# Patient Record
Sex: FEMALE | Race: WHITE | NOT HISPANIC OR LATINO | Employment: PART TIME | URBAN - NONMETROPOLITAN AREA
[De-identification: names, ages, dates, MRNs, and addresses within clinical notes are randomized per-mention and may not be internally consistent; named-entity substitution may affect disease eponyms.]

---

## 2023-09-21 ENCOUNTER — OFFICE VISIT (OUTPATIENT)
Dept: URGENT CARE | Facility: CLINIC | Age: 28
End: 2023-09-21

## 2023-09-21 VITALS
DIASTOLIC BLOOD PRESSURE: 88 MMHG | OXYGEN SATURATION: 98 % | TEMPERATURE: 98 F | RESPIRATION RATE: 18 BRPM | HEART RATE: 99 BPM | SYSTOLIC BLOOD PRESSURE: 121 MMHG

## 2023-09-21 DIAGNOSIS — U07.1 COVID-19: Primary | ICD-10-CM

## 2023-09-21 DIAGNOSIS — J02.9 SORE THROAT: ICD-10-CM

## 2023-09-21 PROBLEM — M79.7 FIBROMYALGIA: Status: ACTIVE | Noted: 2023-09-21

## 2023-09-21 PROBLEM — J45.20 MILD INTERMITTENT ASTHMA WITHOUT COMPLICATION: Status: ACTIVE | Noted: 2023-09-21

## 2023-09-21 LAB
SARS-COV-2 AG UPPER RESP QL IA: POSITIVE
VALID CONTROL: ABNORMAL

## 2023-09-21 PROCEDURE — 87811 SARS-COV-2 COVID19 W/OPTIC: CPT | Performed by: PHYSICIAN ASSISTANT

## 2023-09-21 PROCEDURE — G0383 LEV 4 HOSP TYPE B ED VISIT: HCPCS | Performed by: PHYSICIAN ASSISTANT

## 2023-09-21 RX ORDER — NIRMATRELVIR AND RITONAVIR 300-100 MG
3 KIT ORAL 2 TIMES DAILY
Qty: 30 TABLET | Refills: 0 | Status: SHIPPED | OUTPATIENT
Start: 2023-09-21 | End: 2023-09-26

## 2023-09-21 RX ORDER — NORETHINDRONE ACETATE AND ETHINYL ESTRADIOL AND FERROUS FUMARATE 5-7-9-7
KIT ORAL DAILY
COMMUNITY

## 2023-09-21 RX ORDER — ALBUTEROL SULFATE 90 UG/1
2 AEROSOL, METERED RESPIRATORY (INHALATION) EVERY 6 HOURS PRN
COMMUNITY
Start: 2023-05-25

## 2023-09-21 RX ORDER — METFORMIN HYDROCHLORIDE 500 MG/1
TABLET, EXTENDED RELEASE ORAL
COMMUNITY

## 2023-09-21 NOTE — LETTER
September 21, 2023     Patient: Justine Krabbe   YOB: 1995   Date of Visit: 9/21/2023       To Whom it May Concern:    Justine Krabbe was seen in my clinic on 9/21/2023. She may return to work on 09/25/2023 . She tested positive for COVID    If you have any questions or concerns, please don't hesitate to call.          Sincerely,          AYAAN Hobbs        CC: No Recipients

## 2023-09-25 ENCOUNTER — TELEPHONE (OUTPATIENT)
Dept: URGENT CARE | Facility: CLINIC | Age: 28
End: 2023-09-25

## 2023-09-25 NOTE — TELEPHONE ENCOUNTER
Patient was seen in the clinic on 921 and tested positive for COVID. She states that she still has fever and nausea. She is requesting a note to be off of work for few more days for continued symptoms of COVID. I will extend her note for 2 more days. She already meets the guidelines as per CDC for 5-day quarantine. She was instructed to follow-up with her PCP or go to the ER if symptoms worsen.

## 2023-09-25 NOTE — LETTER
September 25, 2023     Patient: Cm Ring  YOB: 1995  Date of Visit: 9/25/2023      To Whom it May Concern:    Cm Ring is under my professional care. Nathan Catherine was seen in my office on 9/21/2023. Nathan Catherine may return to work on 09/27/2023 . If you have any questions or concerns, please don't hesitate to call.          Sincerely,          Keira Park PA-C        CC: No Recipients

## 2023-11-19 ENCOUNTER — HOSPITAL ENCOUNTER (EMERGENCY)
Facility: HOSPITAL | Age: 28
Discharge: HOME | End: 2023-11-19
Attending: EMERGENCY MEDICINE

## 2023-11-19 ENCOUNTER — APPOINTMENT (EMERGENCY)
Dept: CT IMAGING | Facility: HOSPITAL | Age: 28
End: 2023-11-19

## 2023-11-19 VITALS
DIASTOLIC BLOOD PRESSURE: 58 MMHG | SYSTOLIC BLOOD PRESSURE: 105 MMHG | HEIGHT: 66 IN | HEART RATE: 74 BPM | BODY MASS INDEX: 30.15 KG/M2 | OXYGEN SATURATION: 97 % | WEIGHT: 187.61 LBS | TEMPERATURE: 96.7 F | RESPIRATION RATE: 18 BRPM

## 2023-11-19 DIAGNOSIS — K62.5 BRBPR (BRIGHT RED BLOOD PER RECTUM): ICD-10-CM

## 2023-11-19 DIAGNOSIS — R10.9 ABDOMINAL PAIN: ICD-10-CM

## 2023-11-19 DIAGNOSIS — R19.7 NAUSEA VOMITING AND DIARRHEA: Primary | ICD-10-CM

## 2023-11-19 DIAGNOSIS — R11.2 NAUSEA VOMITING AND DIARRHEA: Primary | ICD-10-CM

## 2023-11-19 LAB
ALBUMIN SERPL BCP-MCNC: 4.6 G/DL (ref 3.5–5)
ALP SERPL-CCNC: 34 U/L (ref 34–104)
ALT SERPL W P-5'-P-CCNC: 16 U/L (ref 7–52)
ANION GAP SERPL CALCULATED.3IONS-SCNC: 15 MMOL/L
ANISOCYTOSIS BLD QL SMEAR: PRESENT
AST SERPL W P-5'-P-CCNC: 12 U/L (ref 13–39)
BACTERIA UR QL AUTO: ABNORMAL /HPF
BASOPHILS # BLD MANUAL: 0 THOUSAND/UL (ref 0–0.1)
BASOPHILS NFR MAR MANUAL: 0 % (ref 0–1)
BILIRUB SERPL-MCNC: 0.57 MG/DL (ref 0.2–1)
BILIRUB UR QL STRIP: NEGATIVE
BUN SERPL-MCNC: 14 MG/DL (ref 5–25)
CALCIUM SERPL-MCNC: 9.8 MG/DL (ref 8.4–10.2)
CHLORIDE SERPL-SCNC: 101 MMOL/L (ref 96–108)
CLARITY UR: CLEAR
CO2 SERPL-SCNC: 21 MMOL/L (ref 21–32)
COLOR UR: YELLOW
CREAT SERPL-MCNC: 0.81 MG/DL (ref 0.6–1.3)
EOSINOPHIL # BLD MANUAL: 0 THOUSAND/UL (ref 0–0.4)
EOSINOPHIL NFR BLD MANUAL: 0 % (ref 0–6)
ERYTHROCYTE [DISTWIDTH] IN BLOOD BY AUTOMATED COUNT: 13.2 % (ref 11.6–15.1)
GFR SERPL CREATININE-BSD FRML MDRD: 99 ML/MIN/1.73SQ M
GLUCOSE SERPL-MCNC: 151 MG/DL (ref 65–140)
GLUCOSE UR STRIP-MCNC: NEGATIVE MG/DL
HCG SERPL QL: NEGATIVE
HCT VFR BLD AUTO: 44.1 % (ref 34.8–46.1)
HGB BLD-MCNC: 14.5 G/DL (ref 11.5–15.4)
HGB UR QL STRIP.AUTO: ABNORMAL
KETONES UR STRIP-MCNC: ABNORMAL MG/DL
LACTATE SERPL-SCNC: 3.4 MMOL/L (ref 0.5–2)
LACTATE SERPL-SCNC: 4.6 MMOL/L (ref 0.5–2)
LEUKOCYTE ESTERASE UR QL STRIP: NEGATIVE
LIPASE SERPL-CCNC: 10 U/L (ref 11–82)
LYMPHOCYTES # BLD AUTO: 0 % (ref 14–44)
LYMPHOCYTES # BLD AUTO: 0.42 THOUSAND/UL (ref 0.6–4.47)
MCH RBC QN AUTO: 28.8 PG (ref 26.8–34.3)
MCHC RBC AUTO-ENTMCNC: 32.9 G/DL (ref 31.4–37.4)
MCV RBC AUTO: 88 FL (ref 82–98)
MONOCYTES # BLD AUTO: 0 THOUSAND/UL (ref 0–1.22)
MONOCYTES NFR BLD: 0 % (ref 4–12)
MUCOUS THREADS UR QL AUTO: ABNORMAL
NEUTROPHILS # BLD MANUAL: 20.5 THOUSAND/UL (ref 1.85–7.62)
NEUTS BAND NFR BLD MANUAL: 4 % (ref 0–8)
NEUTS SEG NFR BLD AUTO: 94 % (ref 43–75)
NITRITE UR QL STRIP: NEGATIVE
NON-SQ EPI CELLS URNS QL MICRO: ABNORMAL /HPF
PH UR STRIP.AUTO: 6.5 [PH]
PLATELET # BLD AUTO: 380 THOUSANDS/UL (ref 149–390)
PLATELET BLD QL SMEAR: ADEQUATE
PMV BLD AUTO: 9.4 FL (ref 8.9–12.7)
POTASSIUM SERPL-SCNC: 4.1 MMOL/L (ref 3.5–5.3)
PROT SERPL-MCNC: 7.6 G/DL (ref 6.4–8.4)
PROT UR STRIP-MCNC: NEGATIVE MG/DL
RBC # BLD AUTO: 5.04 MILLION/UL (ref 3.81–5.12)
RBC #/AREA URNS AUTO: ABNORMAL /HPF
RBC MORPH BLD: PRESENT
SODIUM SERPL-SCNC: 137 MMOL/L (ref 135–147)
SP GR UR STRIP.AUTO: 1.02 (ref 1–1.03)
UROBILINOGEN UR QL STRIP.AUTO: 0.2 E.U./DL
VARIANT LYMPHS # BLD AUTO: 2 %
WBC # BLD AUTO: 20.92 THOUSAND/UL (ref 4.31–10.16)
WBC #/AREA URNS AUTO: ABNORMAL /HPF

## 2023-11-19 PROCEDURE — 99284 EMERGENCY DEPT VISIT MOD MDM: CPT

## 2023-11-19 PROCEDURE — 96361 HYDRATE IV INFUSION ADD-ON: CPT

## 2023-11-19 PROCEDURE — 80053 COMPREHEN METABOLIC PANEL: CPT | Performed by: EMERGENCY MEDICINE

## 2023-11-19 PROCEDURE — 83605 ASSAY OF LACTIC ACID: CPT | Performed by: EMERGENCY MEDICINE

## 2023-11-19 PROCEDURE — 74177 CT ABD & PELVIS W/CONTRAST: CPT

## 2023-11-19 PROCEDURE — 96375 TX/PRO/DX INJ NEW DRUG ADDON: CPT

## 2023-11-19 PROCEDURE — 81001 URINALYSIS AUTO W/SCOPE: CPT | Performed by: EMERGENCY MEDICINE

## 2023-11-19 PROCEDURE — 85027 COMPLETE CBC AUTOMATED: CPT | Performed by: EMERGENCY MEDICINE

## 2023-11-19 PROCEDURE — 82272 OCCULT BLD FECES 1-3 TESTS: CPT

## 2023-11-19 PROCEDURE — 83690 ASSAY OF LIPASE: CPT | Performed by: EMERGENCY MEDICINE

## 2023-11-19 PROCEDURE — 96374 THER/PROPH/DIAG INJ IV PUSH: CPT

## 2023-11-19 PROCEDURE — 93005 ELECTROCARDIOGRAM TRACING: CPT

## 2023-11-19 PROCEDURE — 84703 CHORIONIC GONADOTROPIN ASSAY: CPT | Performed by: EMERGENCY MEDICINE

## 2023-11-19 PROCEDURE — 99285 EMERGENCY DEPT VISIT HI MDM: CPT | Performed by: EMERGENCY MEDICINE

## 2023-11-19 PROCEDURE — G1004 CDSM NDSC: HCPCS

## 2023-11-19 PROCEDURE — 85007 BL SMEAR W/DIFF WBC COUNT: CPT | Performed by: EMERGENCY MEDICINE

## 2023-11-19 PROCEDURE — 36415 COLL VENOUS BLD VENIPUNCTURE: CPT | Performed by: EMERGENCY MEDICINE

## 2023-11-19 RX ORDER — KETOROLAC TROMETHAMINE 30 MG/ML
15 INJECTION, SOLUTION INTRAMUSCULAR; INTRAVENOUS ONCE
Status: COMPLETED | OUTPATIENT
Start: 2023-11-19 | End: 2023-11-19

## 2023-11-19 RX ORDER — ONDANSETRON 2 MG/ML
4 INJECTION INTRAMUSCULAR; INTRAVENOUS ONCE
Status: COMPLETED | OUTPATIENT
Start: 2023-11-19 | End: 2023-11-19

## 2023-11-19 RX ORDER — MORPHINE SULFATE 4 MG/ML
4 INJECTION, SOLUTION INTRAMUSCULAR; INTRAVENOUS ONCE
Status: COMPLETED | OUTPATIENT
Start: 2023-11-19 | End: 2023-11-19

## 2023-11-19 RX ADMIN — ONDANSETRON 4 MG: 2 INJECTION INTRAMUSCULAR; INTRAVENOUS at 05:40

## 2023-11-19 RX ADMIN — KETOROLAC TROMETHAMINE 15 MG: 30 INJECTION, SOLUTION INTRAMUSCULAR at 06:26

## 2023-11-19 RX ADMIN — SODIUM CHLORIDE 1000 ML: 0.9 INJECTION, SOLUTION INTRAVENOUS at 07:48

## 2023-11-19 RX ADMIN — IOHEXOL 100 ML: 350 INJECTION, SOLUTION INTRAVENOUS at 06:57

## 2023-11-19 RX ADMIN — MORPHINE SULFATE 4 MG: 4 INJECTION, SOLUTION INTRAMUSCULAR; INTRAVENOUS at 07:12

## 2023-11-19 RX ADMIN — SODIUM CHLORIDE 1000 ML: 0.9 INJECTION, SOLUTION INTRAVENOUS at 05:40

## 2023-11-19 NOTE — DISCHARGE INSTRUCTIONS
You have been seen for vomiting, abdominal pain and bloody stools. Please trial motrin if symptoms return. Return to the emergency department if you develop worsening pain, vomiting, lightheadedness, persistent blood in stool or any other symptoms of concern. Please follow up with your PCP and GI by calling the number provided.

## 2023-11-19 NOTE — ED PROVIDER NOTES
History  Chief Complaint   Patient presents with    Abdominal Pain     Patient reports vomiting since after eating dinner last night. Also reports multiple episodes of bright red blood in stool. Tila Mares is a 29y.o. year old female with PMH of PCOS, kidney stones, DM, fibromyalgia presenting to the [facility] ED for vomiting and abdominal pain. Patient reports onset of vomiting around 1900 last night after eating dinner. Several hours later she started with progressively worsening generalized abdominal pain. Pain mostly localized to epigastric region. Currently rated as severe in intensity and intermittently radiating superiorly. Patient reported to have three episodes of bright red bloody diarrhea this morning. Patient denies fevers/chills. No chest pain or dyspnea. Patient denies dysuria/hematuria or flank pain. FDLNMP approximately three weeks ago. Patient denies history of chronic abdominal pain or blood in stool previously. No history of GERD or gastric ulcers. Significant other also reportedly had GI illness as well. No recent travel. Patient has not taken/received any medications at home for relief of symptoms. No prior abdominal surgeries. History provided by:  Medical records, patient and significant other   used: No    Abdominal Pain  Associated symptoms: nausea and vomiting    Associated symptoms: no chest pain, no chills, no cough, no dysuria, no fever, no hematuria, no shortness of breath, no vaginal bleeding and no vaginal discharge        Prior to Admission Medications   Prescriptions Last Dose Informant Patient Reported? Taking?    albuterol (PROVENTIL HFA,VENTOLIN HFA) 90 mcg/act inhaler   Yes No   Sig: Inhale 2 puffs every 6 (six) hours as needed   metFORMIN (GLUCOPHAGE-XR) 500 mg 24 hr tablet   Yes No   Sig: Take by mouth daily with dinner   norethindrone-ethinyl estradiol-iron (ESTROSTEP FE) 1-20/1-30/1-35 MG-MCG TABS   Yes No   Sig: Take by mouth daily Facility-Administered Medications: None       Past Medical History:   Diagnosis Date    Diabetes mellitus (HCC)     Fibromyalgia     PCOS (polycystic ovarian syndrome)        History reviewed. No pertinent surgical history. History reviewed. No pertinent family history. I have reviewed and agree with the history as documented. E-Cigarette/Vaping     E-Cigarette/Vaping Substances     Social History     Tobacco Use    Smoking status: Never    Smokeless tobacco: Never   Substance Use Topics    Alcohol use: Yes     Comment: social    Drug use: Yes     Types: Marijuana       Review of Systems   Constitutional:  Negative for chills and fever. Respiratory:  Negative for cough and shortness of breath. Cardiovascular:  Negative for chest pain. Gastrointestinal:  Positive for abdominal pain, blood in stool, nausea and vomiting. Genitourinary:  Negative for dysuria, flank pain, hematuria, vaginal bleeding and vaginal discharge. Musculoskeletal:  Negative for arthralgias. Skin:  Negative for rash. Neurological:  Negative for syncope. All other systems reviewed and are negative. Physical Exam  Physical Exam  Vitals and nursing note reviewed. Exam conducted with a chaperone present. Constitutional:       General: She is not in acute distress. Appearance: Normal appearance. She is ill-appearing (appears uncomfortable). She is not toxic-appearing or diaphoretic. HENT:      Head: Normocephalic and atraumatic. Nose: No congestion or rhinorrhea. Eyes:      General:         Right eye: No discharge. Left eye: No discharge. Cardiovascular:      Rate and Rhythm: Normal rate and regular rhythm. Pulmonary:      Effort: Pulmonary effort is normal. No accessory muscle usage or respiratory distress. Breath sounds: Normal breath sounds. No stridor. No decreased breath sounds, wheezing, rhonchi or rales. Abdominal:      General: Bowel sounds are normal. There is no distension. Palpations: Abdomen is soft. Tenderness: There is generalized abdominal tenderness and tenderness in the epigastric area. There is no right CVA tenderness, left CVA tenderness, guarding or rebound. Genitourinary:     Rectum: Guaiac result positive. No mass, tenderness, anal fissure or external hemorrhoid. Musculoskeletal:      Cervical back: Normal range of motion and neck supple. Right lower leg: No tenderness. No edema. Left lower leg: No tenderness. No edema. Skin:     Capillary Refill: Capillary refill takes less than 2 seconds. Findings: No rash. Neurological:      Mental Status: She is alert and oriented to person, place, and time.    Psychiatric:         Mood and Affect: Mood normal.         Behavior: Behavior normal.         Vital Signs  ED Triage Vitals [11/19/23 0523]   Temperature Pulse Respirations Blood Pressure SpO2   (!) 96.7 °F (35.9 °C) 72 18 140/64 99 %      Temp Source Heart Rate Source Patient Position - Orthostatic VS BP Location FiO2 (%)   Temporal Monitor Lying Left arm --      Pain Score       9           Vitals:    11/19/23 0523 11/19/23 0730 11/19/23 0900 11/19/23 1000   BP: 140/64 102/58 107/74 105/58   Pulse: 72 70 78 74   Patient Position - Orthostatic VS: Lying Lying Lying Lying         Visual Acuity      ED Medications  Medications   sodium chloride 0.9 % bolus 1,000 mL (0 mL Intravenous Stopped 11/19/23 0714)   ondansetron (ZOFRAN) injection 4 mg (4 mg Intravenous Given 11/19/23 0540)   ketorolac (TORADOL) injection 15 mg (15 mg Intravenous Given 11/19/23 0626)   iohexol (OMNIPAQUE) 350 MG/ML injection (MULTI-DOSE) 100 mL (100 mL Intravenous Given 11/19/23 0657)   morphine injection 4 mg (4 mg Intravenous Given 11/19/23 0712)   sodium chloride 0.9 % bolus 1,000 mL (0 mL Intravenous Stopped 11/19/23 0942)       Diagnostic Studies  Results Reviewed       Procedure Component Value Units Date/Time    Lactic acid 2 Hours [[id number]]  (Abnormal) Collected: 11/19/23 0941    Lab Status: Final result Specimen: Blood from Arm, Right Updated: 11/19/23 1018     LACTIC ACID 3.4 mmol/L     Narrative:      Result may be elevated if tourniquet was used during collection. Lactic acid, plasma (w/reflex if result > 2.0) [[id number]]  (Abnormal) Collected: 11/19/23 0714    Lab Status: Final result Specimen: Blood from Arm, Right Updated: 11/19/23 0739     LACTIC ACID 4.6 mmol/L     Narrative:      Result may be elevated if tourniquet was used during collection.     Urine Microscopic [[id number]]  (Abnormal) Collected: 11/19/23 0648    Lab Status: Final result Specimen: Urine, Clean Catch Updated: 11/19/23 0703     RBC, UA 1-2 /hpf      WBC, UA 0-1 /hpf      Epithelial Cells Occasional /hpf      Bacteria, UA Occasional /hpf      MUCUS THREADS Innumerable    RBC Morphology Reflex Test [[id number]] Collected: 11/19/23 0537    Lab Status: Final result Specimen: Blood from Arm, Right Updated: 11/19/23 0701    UA w Reflex to Microscopic w Reflex to Culture [[id number]]  (Abnormal) Collected: 11/19/23 0648    Lab Status: Final result Specimen: Urine, Clean Catch Updated: 11/19/23 0655     Color, UA Yellow     Clarity, UA Clear     Specific Gravity, UA 1.025     pH, UA 6.5     Leukocytes, UA Negative     Nitrite, UA Negative     Protein, UA Negative mg/dl      Glucose, UA Negative mg/dl      Ketones, UA 40 (2+) mg/dl      Urobilinogen, UA 0.2 E.U./dl      Bilirubin, UA Negative     Occult Blood, UA Trace-Intact    Comprehensive metabolic panel [[id number]]  (Abnormal) Collected: 11/19/23 0537    Lab Status: Final result Specimen: Blood from Arm, Right Updated: 11/19/23 0624     Sodium 137 mmol/L      Potassium 4.1 mmol/L      Chloride 101 mmol/L      CO2 21 mmol/L      ANION GAP 15 mmol/L      BUN 14 mg/dL      Creatinine 0.81 mg/dL      Glucose 151 mg/dL      Calcium 9.8 mg/dL      AST 12 U/L      ALT 16 U/L      Alkaline Phosphatase 34 U/L      Total Protein 7.6 g/dL      Albumin 4.6 g/dL Total Bilirubin 0.57 mg/dL      eGFR 99 ml/min/1.73sq m     Narrative:      [facility] guidelines for Chronic Kidney Disease (CKD):     Stage 1 with normal or high GFR (GFR > 90 mL/min/1.73 square meters)    Stage 2 Mild CKD (GFR = 60-89 mL/min/1.73 square meters)    Stage 3A Moderate CKD (GFR = 45-59 mL/min/1.73 square meters)    Stage 3B Moderate CKD (GFR = 30-44 mL/min/1.73 square meters)    Stage 4 Severe CKD (GFR = 15-29 mL/min/1.73 square meters)    Stage 5 End Stage CKD (GFR <15 mL/min/1.73 square meters)  Note: GFR calculation is accurate only with a steady state creatinine    Lipase [[id number]]  (Abnormal) Collected: 11/19/23 0537    Lab Status: Final result Specimen: Blood from Arm, Right Updated: 11/19/23 0624     Lipase 10 u/L     hCG, qualitative pregnancy [[id number]]  (Normal) Collected: 11/19/23 0537    Lab Status: Final result Specimen: Blood from Arm, Right Updated: 11/19/23 0624     Preg, Serum Negative    CBC and differential [[id number]]  (Abnormal) Collected: 11/19/23 0537    Lab Status: Final result Specimen: Blood from Arm, Right Updated: 11/19/23 0615     WBC 20.92 Thousand/uL      RBC 5.04 Million/uL      Hemoglobin 14.5 g/dL      Hematocrit 44.1 %      MCV 88 fL      MCH 28.8 pg      MCHC 32.9 g/dL      RDW 13.2 %      MPV 9.4 fL      Platelets 571 Thousands/uL     Narrative: This is an appended report. These results have been appended to a previously verified report.     Manual Differential(PHLEBS Do Not Order) [[id number]]  (Abnormal) Collected: 11/19/23 0537    Lab Status: Final result Specimen: Blood from Arm, Right Updated: 11/19/23 0615     Segmented % 94 %      Bands % 4 %      Lymphocytes % 0 %      Monocytes % 0 %      Eosinophils, % 0 %      Basophils % 0 %      Atypical Lymphocytes % 2 %      Absolute Neutrophils 20.50 Thousand/uL      Lymphocytes Absolute 0.42 Thousand/uL      Monocytes Absolute 0.00 Thousand/uL      Eosinophils Absolute 0.00 Thousand/uL      Basophils Absolute 0.00 Thousand/uL      Total Counted --     RBC Morphology Present     Platelet Estimate Adequate     Anisocytosis Present                   CT abdomen pelvis with contrast   Final Result by Mallory Lopez MD (11/19 5346)   No acute infectious or inflammatory process. Workstation performed: [id number]                    Procedures  Procedures         ED Course  ED Course as of 11/20/23 Dylan7   Camille Maxwell Nov 19, 2023   0530 Procedure Note: EKG  Date/Time: 11/19/23 5:30 AM   Interpreted by: Yakelin Costa DO  Indications / Diagnosis: vomiting, epigastric pain  ECG reviewed by me, the ED Provider: yes   The EKG demonstrates:  Rhythm: normal sinus rhythm 74 BPM  Intervals: Normal MS and QT intervals  Axis: Normal axis  QRS/Blocks: Normal QRS  ST Changes: No acute ST/T waves changes. No GRACIA. No TWI.   0551 Hemoglobin: 14.5   0551 WBC(!): 20.92                               SBIRT 20yo+      Flowsheet Row Most Recent Value   Initial Alcohol Screen: US AUDIT-C     1. How often do you have a drink containing alcohol? 0 Filed at: 11/19/2023 0523   2. How many drinks containing alcohol do you have on a typical day you are drinking? 0 Filed at: 11/19/2023 0523   3a. Male UNDER 65: How often do you have five or more drinks on one occasion? 0 Filed at: 11/19/2023 0523   3b. FEMALE Any Age, or MALE 65+: How often do you have 4 or more drinks on one occassion? 0 Filed at: 11/19/2023 0523   Audit-C Score 0 Filed at: 11/19/2023 6572   MAYDA: How many times in the past year have you. .. Used an illegal drug or used a prescription medication for non-medical reasons? Never Filed at: 11/19/2023 5387                      Medical Decision Making    29 y.o. female presenting for vomiting, abdominal pain and bloody stools. VSS though patient reporting discomfort on arrival.  Will order labs, UA and CT to evaluate for biliary disease, pancreatitis, colitis, appendicitis, UTI/pyelonephritis.   We will treat with IV fluids and symptom relief. Reassessment: Patient comfortable appearing in the room. Reported to have resolution of pain. Reviewed labs, UA and CT results with the patient. Discussed potential etiology of hematochezia including colitis, inflammatory bowel disease. Given absence of colitis on CT, will defer antibiotics at this time. Do not suspect AVM or upper GI bleeding. Emphasized the need to have formal evaluation by GI. Patient declines transfer for GI evaluation at this time. Disposition: I have discussed with the patient our plan to discharge them from the ED and the patient is in agreement with this plan. Discharge Plan: Discussed as needed Motrin should symptoms return. Emphasized need for formal GI Evaluation as outpatient. RTED precautions emphasized. The patient was provided a written after visit summary with strict RTED precautions. Followup: I have discussed with the patient plan to follow up with their PCP and GI. Contact information provided in AVS.    Amount and/or Complexity of Data Reviewed  Labs: ordered. Decision-making details documented in ED Course. Radiology: ordered. Risk  Prescription drug management. Disposition  Final diagnoses:   Nausea vomiting and diarrhea   BRBPR (bright red blood per rectum)   Abdominal pain     Time reflects when diagnosis was documented in both MDM as applicable and the Disposition within this note       Time User Action Codes Description Comment    11/19/2023  8:14 AM Darlen Pronto Add [R11.2,  R19.7] Nausea vomiting and diarrhea     11/19/2023  8:14 AM Darlen Pronto Add [K62.5] BRBPR (bright red blood per rectum)     11/19/2023 10:23 AM Rachelle Promise Add [R10.9] Abdominal pain           ED Disposition       ED Disposition   Discharge    Condition   Stable    Date/Time   Sun Nov 19, 2023 2025 [facility] discharge to home/self care.                    Follow-up Information       Follow up With Specialties Details Why Contact Info Additional Information    Susan Espinoza MD Family Medicine Schedule an appointment as soon as possible for a visit  To make appointment for reevaluation ASAP. [address] [facility] Schedule an appointment as soon as possible for a visit  To establish care. [address] [phone] [address] [facility] [place], [address] [place] [address], [address] [place] [address]            Discharge Medication List as of 11/19/2023 10:24 AM        CONTINUE these medications which have NOT CHANGED    Details   albuterol (PROVENTIL HFA,VENTOLIN HFA) 90 mcg/act inhaler Inhale 2 puffs every 6 (six) hours as needed, Starting Thu 5/25/2023, Historical Med      metFORMIN (GLUCOPHAGE-XR) 500 mg 24 hr tablet Take by mouth daily with dinner, Historical Med      norethindrone-ethinyl estradiol-iron (ESTROSTEP FE) 1-20/1-30/1-35 MG-MCG TABS Take by mouth daily, Historical Med             No discharge procedures on file.     PDMP Review       None            ED Provider  Electronically Signed by             Deep Sadler DO  11/20/23 4232

## 2023-11-19 NOTE — Clinical Note
Donta Whitaker was seen and treated in our emergency department on 11/19/2023. Diagnosis:     Ashley Childs  may return to work on return date. She may return on this date: 11/21/2023         If you have any questions or concerns, please don't hesitate to call.       Maybell Hamman, MD    ______________________________           _______________          _______________  Hospital Representative                              Date                                Time

## 2023-11-20 LAB
ATRIAL RATE: 74 BPM
P AXIS: 40 DEGREES
PR INTERVAL: 168 MS
QRS AXIS: 76 DEGREES
QRSD INTERVAL: 82 MS
QT INTERVAL: 416 MS
QTC INTERVAL: 461 MS
T WAVE AXIS: 46 DEGREES
VENTRICULAR RATE: 74 BPM

## 2023-11-20 PROCEDURE — 93010 ELECTROCARDIOGRAM REPORT: CPT | Performed by: INTERNAL MEDICINE

## 2023-12-04 ENCOUNTER — OFFICE VISIT (OUTPATIENT)
Dept: GASTROENTEROLOGY | Facility: CLINIC | Age: 28
End: 2023-12-04

## 2023-12-04 ENCOUNTER — APPOINTMENT (OUTPATIENT)
Dept: LAB | Facility: HOSPITAL | Age: 28
End: 2023-12-04

## 2023-12-04 VITALS
BODY MASS INDEX: 30.05 KG/M2 | RESPIRATION RATE: 16 BRPM | DIASTOLIC BLOOD PRESSURE: 88 MMHG | OXYGEN SATURATION: 97 % | WEIGHT: 187 LBS | HEIGHT: 66 IN | TEMPERATURE: 97.7 F | HEART RATE: 73 BPM | SYSTOLIC BLOOD PRESSURE: 122 MMHG

## 2023-12-04 DIAGNOSIS — R19.4 CHANGE IN BOWEL HABITS: ICD-10-CM

## 2023-12-04 DIAGNOSIS — Z80.0 FAMILY HISTORY OF COLON CANCER: ICD-10-CM

## 2023-12-04 DIAGNOSIS — R12 HEARTBURN: ICD-10-CM

## 2023-12-04 DIAGNOSIS — K92.1 HEMATOCHEZIA: ICD-10-CM

## 2023-12-04 DIAGNOSIS — R11.2 NAUSEA AND VOMITING, UNSPECIFIED VOMITING TYPE: Primary | ICD-10-CM

## 2023-12-04 LAB — IGA SERPL-MCNC: 131 MG/DL (ref 66–433)

## 2023-12-04 PROCEDURE — 86364 TISS TRNSGLTMNASE EA IG CLAS: CPT

## 2023-12-04 PROCEDURE — 99244 OFF/OP CNSLTJ NEW/EST MOD 40: CPT | Performed by: PHYSICIAN ASSISTANT

## 2023-12-04 PROCEDURE — 82784 ASSAY IGA/IGD/IGG/IGM EACH: CPT

## 2023-12-04 PROCEDURE — 36415 COLL VENOUS BLD VENIPUNCTURE: CPT

## 2023-12-04 RX ORDER — IBUPROFEN 600 MG/1
600 TABLET ORAL
COMMUNITY
End: 2023-12-04

## 2023-12-04 RX ORDER — ONDANSETRON 4 MG/1
1 TABLET, FILM COATED ORAL EVERY 8 HOURS PRN
COMMUNITY
Start: 2023-08-07

## 2023-12-04 RX ORDER — FAMOTIDINE 40 MG/1
40 TABLET, FILM COATED ORAL DAILY
Qty: 30 TABLET | Refills: 11 | Status: SHIPPED | OUTPATIENT
Start: 2023-12-04 | End: 2024-12-03

## 2023-12-04 RX ORDER — DICYCLOMINE HYDROCHLORIDE 10 MG/1
10 CAPSULE ORAL 3 TIMES DAILY PRN
Qty: 30 CAPSULE | Refills: 5 | Status: SHIPPED | OUTPATIENT
Start: 2023-12-04

## 2023-12-04 RX ORDER — FAMOTIDINE 40 MG/1
40 TABLET, FILM COATED ORAL DAILY
COMMUNITY
Start: 2023-11-27 | End: 2023-12-04 | Stop reason: SDUPTHER

## 2023-12-04 RX ORDER — NORGESTIMATE AND ETHINYL ESTRADIOL 7DAYSX3 28
1 KIT ORAL DAILY
COMMUNITY
End: 2023-12-04

## 2023-12-04 RX ORDER — CYCLOBENZAPRINE HCL 10 MG
10 TABLET ORAL
COMMUNITY

## 2023-12-04 RX ORDER — NORGESTIMATE AND ETHINYL ESTRADIOL 7DAYSX3 28
1 KIT ORAL DAILY
COMMUNITY
Start: 2023-11-15
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
## 2023-12-04 NOTE — PATIENT INSTRUCTIONS
Please stay on famotidine/pepcid daily. Okay to use zofran as needed. Okay for bentyl/dicyclomine as needed for abdominal pain. Please have blood work, stool testing, and EGD/colon completed. You can try beano, gas-x, and IB Stephen.

## 2023-12-05 LAB — TTG IGA SER-ACNC: <2 U/ML (ref 0–3)

## 2023-12-29 ENCOUNTER — TELEPHONE (OUTPATIENT)
Dept: GASTROENTEROLOGY | Facility: CLINIC | Age: 28
End: 2023-12-29

## 2023-12-29 NOTE — TELEPHONE ENCOUNTER
Calling to confirm colonoscopy / EGD scheduled Tues Jan 9th @ [facility]. Please have a responsible adult  to bring you home. Take your regular dose of Metformin with dinner in the evening before your procedure. If you have any questions about your prep or diabetic medications. Please call [phone]. Thank you.

## 2024-01-08 ENCOUNTER — TELEPHONE (OUTPATIENT)
Dept: GASTROENTEROLOGY | Facility: CLINIC | Age: 29
End: 2024-01-08

## 2024-01-08 NOTE — TELEPHONE ENCOUNTER
I called the patient to reschedule her Colonoscopy/EGD with Dr. Jaiyeola. I let her know that she is scheduling out to May 2024. Wanted to see if she wanted to see a different DrMejia to get her in sooner for her Colonoscopy/EGD. She has all the instructions. LMOM to call the office back to reschedule her.

## 2024-02-22 ENCOUNTER — TELEPHONE (OUTPATIENT)
Dept: GASTROENTEROLOGY | Facility: CLINIC | Age: 29
End: 2024-02-22

## 2024-02-22 NOTE — TELEPHONE ENCOUNTER
I called to reschedule this patient for her EGD/ Colonoscopy, LMOM  to call the office back to reschedule.

## 2025-02-27 ENCOUNTER — APPOINTMENT (EMERGENCY)
Dept: CT IMAGING | Facility: HOSPITAL | Age: 30
End: 2025-02-27

## 2025-02-27 ENCOUNTER — HOSPITAL ENCOUNTER (EMERGENCY)
Facility: HOSPITAL | Age: 30
Discharge: HOME | End: 2025-02-27

## 2025-02-27 VITALS
SYSTOLIC BLOOD PRESSURE: 144 MMHG | BODY MASS INDEX: 32.24 KG/M2 | RESPIRATION RATE: 20 BRPM | HEIGHT: 66 IN | WEIGHT: 200.62 LBS | OXYGEN SATURATION: 96 % | HEART RATE: 80 BPM | DIASTOLIC BLOOD PRESSURE: 93 MMHG | TEMPERATURE: 97.1 F

## 2025-02-27 DIAGNOSIS — R10.9 ABDOMINAL PAIN: ICD-10-CM

## 2025-02-27 DIAGNOSIS — N20.1 URETERAL CALCULUS: Primary | ICD-10-CM

## 2025-02-27 DIAGNOSIS — R11.2 NAUSEA AND VOMITING: ICD-10-CM

## 2025-02-27 LAB
ALBUMIN SERPL BCG-MCNC: 4.2 G/DL (ref 3.5–5)
ALP SERPL-CCNC: 41 U/L (ref 34–104)
ALT SERPL W P-5'-P-CCNC: 24 U/L (ref 7–52)
ANION GAP SERPL CALCULATED.3IONS-SCNC: 13 MMOL/L (ref 4–13)
AST SERPL W P-5'-P-CCNC: 16 U/L (ref 13–39)
BACTERIA UR QL AUTO: ABNORMAL /HPF
BASOPHILS # BLD AUTO: 0.09 THOUSANDS/ÂΜL (ref 0–0.1)
BASOPHILS NFR BLD AUTO: 1 % (ref 0–1)
BILIRUB SERPL-MCNC: 0.31 MG/DL (ref 0.2–1)
BILIRUB UR QL STRIP: NEGATIVE
BUN SERPL-MCNC: 12 MG/DL (ref 5–25)
CALCIUM SERPL-MCNC: 9.2 MG/DL (ref 8.4–10.2)
CHLORIDE SERPL-SCNC: 104 MMOL/L (ref 96–108)
CLARITY UR: ABNORMAL
CO2 SERPL-SCNC: 22 MMOL/L (ref 21–32)
COLOR UR: ABNORMAL
CREAT SERPL-MCNC: 1.03 MG/DL (ref 0.6–1.3)
EOSINOPHIL # BLD AUTO: 0.11 THOUSAND/ÂΜL (ref 0–0.61)
EOSINOPHIL NFR BLD AUTO: 1 % (ref 0–6)
ERYTHROCYTE [DISTWIDTH] IN BLOOD BY AUTOMATED COUNT: 13.4 % (ref 11.6–15.1)
EXT PREGNANCY TEST URINE: NEGATIVE
EXT. CONTROL: NORMAL
GFR SERPL CREATININE-BSD FRML MDRD: 73 ML/MIN/1.73SQ M
GLUCOSE SERPL-MCNC: 129 MG/DL (ref 65–140)
GLUCOSE UR STRIP-MCNC: NEGATIVE MG/DL
HCT VFR BLD AUTO: 39.2 % (ref 34.8–46.1)
HGB BLD-MCNC: 12.7 G/DL (ref 11.5–15.4)
HGB UR QL STRIP.AUTO: ABNORMAL
IMM GRANULOCYTES # BLD AUTO: 0.09 THOUSAND/UL (ref 0–0.2)
IMM GRANULOCYTES NFR BLD AUTO: 1 % (ref 0–2)
KETONES UR STRIP-MCNC: NEGATIVE MG/DL
LACTATE SERPL-SCNC: 2.5 MMOL/L (ref 0.5–2)
LEUKOCYTE ESTERASE UR QL STRIP: NEGATIVE
LIPASE SERPL-CCNC: 15 U/L (ref 11–82)
LYMPHOCYTES # BLD AUTO: 2.44 THOUSANDS/ÂΜL (ref 0.6–4.47)
LYMPHOCYTES NFR BLD AUTO: 16 % (ref 14–44)
MCH RBC QN AUTO: 28.5 PG (ref 26.8–34.3)
MCHC RBC AUTO-ENTMCNC: 32.4 G/DL (ref 31.4–37.4)
MCV RBC AUTO: 88 FL (ref 82–98)
MONOCYTES # BLD AUTO: 1.05 THOUSAND/ÂΜL (ref 0.17–1.22)
MONOCYTES NFR BLD AUTO: 7 % (ref 4–12)
NEUTROPHILS # BLD AUTO: 11.51 THOUSANDS/ÂΜL (ref 1.85–7.62)
NEUTS SEG NFR BLD AUTO: 74 % (ref 43–75)
NITRITE UR QL STRIP: NEGATIVE
NON-SQ EPI CELLS URNS QL MICRO: ABNORMAL /HPF
NRBC BLD AUTO-RTO: 0 /100 WBCS
PH UR STRIP.AUTO: 7.5 [PH]
PLATELET # BLD AUTO: 339 THOUSANDS/UL (ref 149–390)
PMV BLD AUTO: 8.8 FL (ref 8.9–12.7)
POTASSIUM SERPL-SCNC: 4 MMOL/L (ref 3.5–5.3)
PROT SERPL-MCNC: 7.2 G/DL (ref 6.4–8.4)
PROT UR STRIP-MCNC: NEGATIVE MG/DL
RBC # BLD AUTO: 4.46 MILLION/UL (ref 3.81–5.12)
RBC #/AREA URNS AUTO: ABNORMAL /HPF
SODIUM SERPL-SCNC: 139 MMOL/L (ref 135–147)
SP GR UR STRIP.AUTO: 1.01 (ref 1–1.03)
UROBILINOGEN UR STRIP-ACNC: <2 MG/DL
WBC # BLD AUTO: 15.29 THOUSAND/UL (ref 4.31–10.16)
WBC #/AREA URNS AUTO: ABNORMAL /HPF

## 2025-02-27 PROCEDURE — 81025 URINE PREGNANCY TEST: CPT

## 2025-02-27 PROCEDURE — 85025 COMPLETE CBC W/AUTO DIFF WBC: CPT

## 2025-02-27 PROCEDURE — 99284 EMERGENCY DEPT VISIT MOD MDM: CPT

## 2025-02-27 PROCEDURE — 96375 TX/PRO/DX INJ NEW DRUG ADDON: CPT

## 2025-02-27 PROCEDURE — 96374 THER/PROPH/DIAG INJ IV PUSH: CPT

## 2025-02-27 PROCEDURE — 36415 COLL VENOUS BLD VENIPUNCTURE: CPT

## 2025-02-27 PROCEDURE — 80053 COMPREHEN METABOLIC PANEL: CPT

## 2025-02-27 PROCEDURE — 96361 HYDRATE IV INFUSION ADD-ON: CPT

## 2025-02-27 PROCEDURE — 83605 ASSAY OF LACTIC ACID: CPT

## 2025-02-27 PROCEDURE — 74176 CT ABD & PELVIS W/O CONTRAST: CPT

## 2025-02-27 PROCEDURE — 81001 URINALYSIS AUTO W/SCOPE: CPT

## 2025-02-27 PROCEDURE — 83690 ASSAY OF LIPASE: CPT

## 2025-02-27 RX ORDER — ONDANSETRON 4 MG/1
4 TABLET, FILM COATED ORAL EVERY 6 HOURS
Qty: 12 TABLET | Refills: 0 | Status: SHIPPED | OUTPATIENT
Start: 2025-02-27

## 2025-02-27 RX ORDER — IBUPROFEN 600 MG/1
600 TABLET, FILM COATED ORAL EVERY 6 HOURS PRN
Qty: 30 TABLET | Refills: 0 | Status: SHIPPED | OUTPATIENT
Start: 2025-02-27

## 2025-02-27 RX ORDER — KETOROLAC TROMETHAMINE 30 MG/ML
15 INJECTION, SOLUTION INTRAMUSCULAR; INTRAVENOUS ONCE
Status: COMPLETED | OUTPATIENT
Start: 2025-02-27 | End: 2025-02-27

## 2025-02-27 RX ORDER — ONDANSETRON 4 MG/1
4 TABLET, ORALLY DISINTEGRATING ORAL EVERY 6 HOURS PRN
Qty: 20 TABLET | Refills: 0 | Status: SHIPPED | OUTPATIENT
Start: 2025-02-27

## 2025-02-27 RX ORDER — MORPHINE SULFATE 4 MG/ML
4 INJECTION, SOLUTION INTRAMUSCULAR; INTRAVENOUS ONCE
Status: COMPLETED | OUTPATIENT
Start: 2025-02-27 | End: 2025-02-27

## 2025-02-27 RX ORDER — ONDANSETRON 2 MG/ML
4 INJECTION INTRAMUSCULAR; INTRAVENOUS ONCE
Status: COMPLETED | OUTPATIENT
Start: 2025-02-27 | End: 2025-02-27

## 2025-02-27 RX ADMIN — SODIUM CHLORIDE 1000 ML: 0.9 INJECTION, SOLUTION INTRAVENOUS at 06:26

## 2025-02-27 RX ADMIN — ONDANSETRON 4 MG: 2 INJECTION INTRAMUSCULAR; INTRAVENOUS at 08:14

## 2025-02-27 RX ADMIN — KETOROLAC TROMETHAMINE 15 MG: 30 INJECTION, SOLUTION INTRAMUSCULAR at 06:27

## 2025-02-27 RX ADMIN — ONDANSETRON 4 MG: 2 INJECTION INTRAMUSCULAR; INTRAVENOUS at 06:26

## 2025-02-27 RX ADMIN — MORPHINE SULFATE 4 MG: 4 INJECTION, SOLUTION INTRAMUSCULAR; INTRAVENOUS at 08:16

## 2025-02-27 NOTE — ED NOTES
Patient ambulated to the bathroom with steady gait at this time.      Sandy Vasquez RN  02/27/25 0782

## 2025-02-27 NOTE — Clinical Note
Fifi Quijano was seen and treated in our emergency department on 2/27/2025.                Diagnosis:     Fifi  is off the rest of the shift today.    She may return on this date:          If you have any questions or concerns, please don't hesitate to call.      Vinnie Mina MD    ______________________________           _______________          _______________  Hospital Representative                              Date                                Time

## 2025-02-27 NOTE — ED NOTES
Patient aware the need for a urine sample, unable to provide one at this time.      Sandy Vasquez RN  02/27/25 0699

## 2025-02-27 NOTE — ED PROVIDER NOTES
Time reflects when diagnosis was documented in both MDM as applicable and the Disposition within this note       Time User Action Codes Description Comment    2/27/2025  7:00 AM Vinnie Mina Add [R10.9] Abdominal pain     2/27/2025  7:00 AM Vinnie Mina Add [R11.2] Nausea and vomiting     2/27/2025  8:05 AM Binsanamaria, Tomas T Add [N20.1] Ureteral calculus     2/27/2025  8:05 AM Binsanamaria, Tomas T Modify [R10.9] Abdominal pain     2/27/2025  8:05 AM Binsanamaria, Tomas T Modify [N20.1] Ureteral calculus           ED Disposition       ED Disposition   Discharge    Condition   Stable    Date/Time   Thu Feb 27, 2025  8:05 AM    Comment   Fifi Quijano discharge to home/self care.                   Assessment & Plan       Medical Decision Making  Medical complexity: 29-year-old female presenting with acute onset nausea and vomiting as well as stomach cramping sensations and pains that she states has moved from her left periumbilic and upper quadrant to her left lower quadrant with radiation into her left flank.  Will need rule out pyelonephritis, ureterolithiasis, acute kidney injury, diverticulitis, bowel obstruction.  Will screen lab work to include CBC, CMP, lipase, and will evaluate further with CT scan of the abdomen pelvis.  Will continue to treat with IV fluids, IV antiemetic, and IV analgesia as appropriate.  Will reassess frequently.    Reassessment/disposition: Thankfully, the patient's CT scan did not reveal any ureterolithiasis or hydronephrosis.  It did however reveal some perinephric changes consistent with possible recently passed kidney stone.  No other acute surgical abnormalities were identified either.  At this time, patient's most likely diagnosis is a viral gastroenteritis/stomach bug.  Patient should be taking Zofran at home as needed for nausea and vomiting, drinking plenty of fluids, getting rest, and taking precautions to avoid very young babies or elderly/immunocompromise persons.  Patient  reports understanding of the treatment plan as well as return precautions for severe worsening of her pain, inability to tolerate p.o., concern for dehydration.  Patient was discharged in no acute distress with normal vital signs tolerating p.o.    Amount and/or Complexity of Data Reviewed  Labs: ordered. Decision-making details documented in ED Course.  Radiology: ordered.    Risk  Prescription drug management.        ED Course as of 02/27/25 2321   Thu Feb 27, 2025 0641 WBC(!): 15.29       Medications   sodium chloride 0.9 % bolus 1,000 mL (0 mL Intravenous Stopped 2/27/25 0720)   ondansetron (ZOFRAN) injection 4 mg (4 mg Intravenous Given 2/27/25 0626)   ketorolac (TORADOL) injection 15 mg (15 mg Intravenous Given 2/27/25 0627)   morphine injection 4 mg (4 mg Intravenous Given 2/27/25 0816)   ondansetron (ZOFRAN) injection 4 mg (4 mg Intravenous Given 2/27/25 0814)       ED Risk Strat Scores                            SBIRT 20yo+      Flowsheet Row Most Recent Value   Initial Alcohol Screen: US AUDIT-C     1. How often do you have a drink containing alcohol? 0 Filed at: 02/27/2025 0620   2. How many drinks containing alcohol do you have on a typical day you are drinking?  0 Filed at: 02/27/2025 0620   3a. Male UNDER 65: How often do you have five or more drinks on one occasion? 0 Filed at: 02/27/2025 0620   3b. FEMALE Any Age, or MALE 65+: How often do you have 4 or more drinks on one occassion? 0 Filed at: 02/27/2025 0620   Audit-C Score 0 Filed at: 02/27/2025 0620   MAYDA: How many times in the past year have you...    Used an illegal drug or used a prescription medication for non-medical reasons? Never Filed at: 02/27/2025 0620                            History of Present Illness       Chief Complaint   Patient presents with    Abdominal Pain     Patient reports that she woke up out of sleep at 0300 with left lower abdominal and flank pain. Pt vomiting upon arrival       Past Medical History:   Diagnosis  Date    Diabetes mellitus (HCC)     Fibromyalgia     PCOS (polycystic ovarian syndrome)       History reviewed. No pertinent surgical history.   History reviewed. No pertinent family history.   Social History     Tobacco Use    Smoking status: Never    Smokeless tobacco: Never   Substance Use Topics    Alcohol use: Yes     Comment: social    Drug use: Yes     Types: Marijuana      E-Cigarette/Vaping      E-Cigarette/Vaping Substances      I have reviewed and agree with the history as documented.     This is a 29-year-old female who is presenting with sudden onset left-sided abdominal and flank pain in addition to nausea and vomiting.  Patient reports that she woke up around 3 AM with cramping-like pains in her epigastrium that then moved down towards her left lower quadrant.  She states that same time she was experiencing radiation up into her left flank that she localizes to her lower left back.  She notes that associated with the pain with significant nausea and that turned into multiple episodes of emesis.  She states that she has tried drinking water several times since 3 AM and has vomited with every attempt at p.o.  She has not yet taken any medications to alleviate her symptoms.  Patient denies any hematuria, dysuria, or other urinary changes.  She denies any changes in her bowel habits recently.        Review of Systems   Constitutional:  Negative for chills and fever.   HENT:  Negative for ear pain and sore throat.    Eyes:  Negative for pain and visual disturbance.   Respiratory:  Negative for cough and shortness of breath.    Cardiovascular:  Negative for chest pain and palpitations.   Gastrointestinal:  Positive for abdominal pain, nausea and vomiting. Negative for diarrhea.   Genitourinary:  Positive for flank pain. Negative for dysuria and hematuria.   Musculoskeletal:  Negative for arthralgias and back pain.   Skin:  Negative for color change and rash.   Neurological:  Negative for seizures and  syncope.   All other systems reviewed and are negative.          Objective       ED Triage Vitals [02/27/25 0618]   Temperature Pulse Blood Pressure Respirations SpO2 Patient Position - Orthostatic VS   (!) 96.3 °F (35.7 °C) 70 150/88 20 98 % Sitting      Temp Source Heart Rate Source BP Location FiO2 (%) Pain Score    Temporal Monitor Left arm -- 9      Vitals      Date and Time Temp Pulse SpO2 Resp BP Pain Score FACES Pain Rating User   02/27/25 0819 97.1 °F (36.2 °C) 80 96 % 20 144/93 -- -- BMD   02/27/25 0816 -- -- -- -- -- 7 -- [facility]   02/27/25 0627 -- -- -- -- -- 9 -- [facility]   02/27/25 0618 96.3 °F (35.7 °C) 70 98 % 20 150/88 9 -- PP            Physical Exam  Vitals and nursing note reviewed.   Constitutional:       General: She is not in acute distress.     Appearance: She is well-developed. She is obese.   HENT:      Head: Normocephalic and atraumatic.      Right Ear: External ear normal.      Left Ear: External ear normal.      Nose: Nose normal. No congestion or rhinorrhea.      Mouth/Throat:      Mouth: Mucous membranes are moist.      Pharynx: Oropharynx is clear. No oropharyngeal exudate or posterior oropharyngeal erythema.   Eyes:      General: No scleral icterus.     Extraocular Movements: Extraocular movements intact.      Conjunctiva/sclera: Conjunctivae normal.      Pupils: Pupils are equal, round, and reactive to light.   Cardiovascular:      Rate and Rhythm: Normal rate and regular rhythm.      Pulses: Normal pulses.      Heart sounds: Normal heart sounds. No murmur heard.  Pulmonary:      Effort: Pulmonary effort is normal. No respiratory distress.      Breath sounds: Normal breath sounds. No wheezing or rhonchi.   Abdominal:      General: Abdomen is flat. There is no distension.      Palpations: Abdomen is soft.      Tenderness: There is abdominal tenderness in the periumbilical area, suprapubic area and left lower quadrant. There is left CVA tenderness. There is no guarding.   Musculoskeletal:          General: No swelling.      Cervical back: Neck supple. No rigidity.      Right lower leg: No edema.      Left lower leg: No edema.   Lymphadenopathy:      Cervical: No cervical adenopathy.   Skin:     General: Skin is warm and dry.      Capillary Refill: Capillary refill takes less than 2 seconds.      Coloration: Skin is not jaundiced.      Findings: No rash.   Neurological:      General: No focal deficit present.      Mental Status: She is alert and oriented to person, place, and time. Mental status is at baseline.   Psychiatric:         Mood and Affect: Mood normal.         Behavior: Behavior normal.         Results Reviewed       Procedure Component Value Units Date/Time    Urine Microscopic [[id number]]  (Abnormal) Collected: 02/27/25 0724    Lab Status: Final result Specimen: Urine, Clean Catch Updated: 02/27/25 0749     RBC, UA 20-30 /hpf      WBC, UA 1-2 /hpf      Epithelial Cells Moderate /hpf      Bacteria, UA Occasional /hpf     UA w Reflex to Microscopic w Reflex to Culture [[id number]]  (Abnormal) Collected: 02/27/25 0724    Lab Status: Final result Specimen: Urine, Clean Catch Updated: 02/27/25 0745     Color, UA Light Yellow     Clarity, UA Slightly Cloudy     Specific Gravity, UA 1.015     pH, UA 7.5     Leukocytes, UA Negative     Nitrite, UA Negative     Protein, UA Negative mg/dl      Glucose, UA Negative mg/dl      Ketones, UA Negative mg/dl      Urobilinogen, UA <2.0 mg/dl      Bilirubin, UA Negative     Occult Blood, UA Large    POCT pregnancy, urine [[id number]]  (Normal) Collected: 02/27/25 0725    Lab Status: Final result Updated: 02/27/25 0727     EXT Preg Test, Ur Negative     Control Valid    Comprehensive metabolic panel [[id number]] Collected: 02/27/25 0632    Lab Status: Final result Specimen: Blood from Arm, Right Updated: 02/27/25 0710     Sodium 139 mmol/L      Potassium 4.0 mmol/L      Chloride 104 mmol/L      CO2 22 mmol/L      ANION GAP 13 mmol/L      BUN 12 mg/dL       Creatinine 1.03 mg/dL      Glucose 129 mg/dL      Calcium 9.2 mg/dL      AST 16 U/L      ALT 24 U/L      Alkaline Phosphatase 41 U/L      Total Protein 7.2 g/dL      Albumin 4.2 g/dL      Total Bilirubin 0.31 mg/dL      eGFR 73 ml/min/1.73sq m     Narrative:      [employer] Kid[employer] guidelines for Chronic Kidney Disease (CKD):     Stage 1 with normal or high GFR (GFR > 90 mL/min/1.73 square meters)    Stage 2 Mild CKD (GFR = 60-89 mL/min/1.73 square meters)    Stage 3A Moderate CKD (GFR = 45-59 mL/min/1.73 square meters)    Stage 3B Moderate CKD (GFR = 30-44 mL/min/1.73 square meters)    Stage 4 Severe CKD (GFR = 15-29 mL/min/1.73 square meters)    Stage 5 End Stage CKD (GFR <15 mL/min/1.73 square meters)  Note: GFR calculation is accurate only with a steady state creatinine    Lipase [[id number]]  (Normal) Collected: 02/27/25 0632    Lab Status: Final result Specimen: Blood from Arm, Right Updated: 02/27/25 0710     Lipase 15 u/L     Lactic acid, plasma (w/reflex if result > 2.0) [[id number]]  (Abnormal) Collected: 02/27/25 0636    Lab Status: Final result Specimen: Blood from Arm, Left Updated: 02/27/25 0708     LACTIC ACID 2.5 mmol/L     Narrative:      Result may be elevated if tourniquet was used during collection.    CBC and differential [[id number]]  (Abnormal) Collected: 02/27/25 0632    Lab Status: Final result Specimen: Blood from Arm, Right Updated: 02/27/25 0641     WBC 15.29 Thousand/uL      RBC 4.46 Million/uL      Hemoglobin 12.7 g/dL      Hematocrit 39.2 %      MCV 88 fL      MCH 28.5 pg      MCHC 32.4 g/dL      RDW 13.4 %      MPV 8.8 fL      Platelets 339 Thousands/uL      nRBC 0 /100 WBCs      Segmented % 74 %      Immature Grans % 1 %      Lymphocytes % 16 %      Monocytes % 7 %      Eosinophils Relative 1 %      Basophils Relative 1 %      Absolute Neutrophils 11.51 Thousands/µL      Absolute Immature Grans 0.09 Thousand/uL      Absolute Lymphocytes 2.44 Thousands/µL      Absolute  Monocytes 1.05 Thousand/µL      Eosinophils Absolute 0.11 Thousand/µL      Basophils Absolute 0.09 Thousands/µL             CT renal stone study abdomen pelvis wo contrast   Final Interpretation by Curtis Cornejo MD (02/27 0753)      No urolithiasis or obstructive uropathy. Minimal left perinephric and periureteral fat stranding could represent sequela of a recently passed calculus versus ascending urinary tract infection with pyelonephritis.         The study was marked in EPIC for immediate notification.      Workstation performed: [id number]             Procedures    ED Medication and Procedure Management   Prior to Admission Medications   Prescriptions Last Dose Informant Patient Reported? Taking?   albuterol (PROVENTIL HFA,VENTOLIN HFA) 90 mcg/act inhaler   Yes No   Sig: Inhale 2 puffs every 6 (six) hours as needed   cyclobenzaprine (FLEXERIL) 10 mg tablet Not Taking  Yes No   Sig: Take 10 mg by mouth   Patient not taking: Reported on 2/27/2025   dicyclomine (BENTYL) 10 mg capsule Not Taking  No No   Sig: Take 1 capsule (10 mg total) by mouth 3 (three) times a day as needed (abd cramping)   Patient not taking: Reported on 2/27/2025   famotidine (PEPCID) 40 MG tablet Not Taking  No No   Sig: Take 1 tablet (40 mg total) by mouth daily   Patient not taking: Reported on 2/27/2025   metFORMIN (GLUCOPHAGE-XR) 500 mg 24 hr tablet Not Taking  Yes No   Sig: Take by mouth daily with dinner   Patient not taking: Reported on 2/27/2025   norgestimate-ethinyl estradiol (ORTHO TRI-CYCLEN,TRINESSA) 0.18/0.215/0.25 MG-35 MCG per tablet Not Taking  Yes No   Sig: Take 1 tablet by mouth daily   Patient not taking: Reported on 2/27/2025   ondansetron (ZOFRAN) 4 mg tablet Not Taking  Yes No   Sig: Take 1 tablet by mouth every 8 (eight) hours as needed   Patient not taking: Reported on 2/27/2025   polyethylene glycol (GOLYTELY) 4000 mL solution   No No   Sig: Take 4,000 mL by mouth once for 1 dose      Facility-Administered  Medications: None     Discharge Medication List as of 2/27/2025  8:06 AM        START taking these medications    Details   ibuprofen (MOTRIN) 600 mg tablet Take 1 tablet (600 mg total) by mouth every 6 (six) hours as needed for moderate pain, Starting Thu 2/27/2025, Normal      !! ondansetron (ZOFRAN) 4 mg tablet Take 1 tablet (4 mg total) by mouth every 6 (six) hours, Starting Thu 2/27/2025, Normal      ondansetron (ZOFRAN-ODT) 4 mg disintegrating tablet Take 1 tablet (4 mg total) by mouth every 6 (six) hours as needed for nausea or vomiting for up to 20 doses, Starting Thu 2/27/2025, Normal       !! - Potential duplicate medications found. Please discuss with provider.        CONTINUE these medications which have NOT CHANGED    Details   albuterol (PROVENTIL HFA,VENTOLIN HFA) 90 mcg/act inhaler Inhale 2 puffs every 6 (six) hours as needed, Starting Thu 5/25/2023, Historical Med      cyclobenzaprine (FLEXERIL) 10 mg tablet Take 10 mg by mouth, Historical Med      dicyclomine (BENTYL) 10 mg capsule Take 1 capsule (10 mg total) by mouth 3 (three) times a day as needed (abd cramping), Starting Mon 12/4/2023, Normal      famotidine (PEPCID) 40 MG tablet Take 1 tablet (40 mg total) by mouth daily, Starting Mon 12/4/2023, Until Tue 12/3/2024, Normal      metFORMIN (GLUCOPHAGE-XR) 500 mg 24 hr tablet Take by mouth daily with dinner, Historical Med      norgestimate-ethinyl estradiol (ORTHO TRI-CYCLEN,TRINESSA) 0.18/0.215/0.25 MG-35 MCG per tablet Take 1 tablet by mouth daily, Starting Wed 11/15/2023, Historical Med      !! ondansetron (ZOFRAN) 4 mg tablet Take 1 tablet by mouth every 8 (eight) hours as needed, Starting Mon 8/7/2023, Historical Med      polyethylene glycol (GOLYTELY) 4000 mL solution Take 4,000 mL by mouth once for 1 dose, Starting Mon 12/4/2023, Normal       !! - Potential duplicate medications found. Please discuss with provider.        No discharge procedures on file.  ED SEPSIS DOCUMENTATION   Time  reflects when diagnosis was documented in both MDM as applicable and the Disposition within this note       Time User Action Codes Description Comment    2/27/2025  7:00 AM Vinnie Mina Add [R10.9] Abdominal pain     2/27/2025  7:00 AM Vinnie Mina Add [R11.2] Nausea and vomiting     2/27/2025  8:05 AM Tomas Jimenez Add [N20.1] Ureteral calculus     2/27/2025  8:05 AM Tomas Jimenez Modify [R10.9] Abdominal pain     2/27/2025  8:05 AM Tomas Jimeenz Modify [N20.1] Ureteral calculus                  Vinnie Mina MD  02/27/25 6663

## 2025-02-27 NOTE — DISCHARGE INSTRUCTIONS
If you have severe worsening of your abdominal pain, or if you are unable to keep down fluids and are concerned about dehydration, come back to the emergency department.